# Patient Record
Sex: FEMALE | URBAN - NONMETROPOLITAN AREA
[De-identification: names, ages, dates, MRNs, and addresses within clinical notes are randomized per-mention and may not be internally consistent; named-entity substitution may affect disease eponyms.]

---

## 2020-08-10 ENCOUNTER — NURSE TRIAGE (OUTPATIENT)
Dept: CALL CENTER | Facility: HOSPITAL | Age: 30
End: 2020-08-10

## 2021-06-21 ENCOUNTER — OFFICE VISIT (OUTPATIENT)
Dept: CARDIOLOGY CLINIC | Age: 31
End: 2021-06-21
Payer: MEDICAID

## 2021-06-21 VITALS
HEIGHT: 64 IN | WEIGHT: 130 LBS | HEART RATE: 103 BPM | OXYGEN SATURATION: 97 % | BODY MASS INDEX: 22.2 KG/M2 | DIASTOLIC BLOOD PRESSURE: 62 MMHG | SYSTOLIC BLOOD PRESSURE: 108 MMHG

## 2021-06-21 DIAGNOSIS — R00.2 PALPITATIONS: ICD-10-CM

## 2021-06-21 DIAGNOSIS — R00.2 PALPITATIONS: Primary | ICD-10-CM

## 2021-06-21 DIAGNOSIS — R00.0 TACHYCARDIA: ICD-10-CM

## 2021-06-21 LAB
BASOPHILS ABSOLUTE: 0 K/UL (ref 0–0.2)
BASOPHILS RELATIVE PERCENT: 0.4 % (ref 0–1)
EOSINOPHILS ABSOLUTE: 0.1 K/UL (ref 0–0.6)
EOSINOPHILS RELATIVE PERCENT: 0.9 % (ref 0–5)
HCT VFR BLD CALC: 42.5 % (ref 37–47)
HEMOGLOBIN: 14.5 G/DL (ref 12–16)
IMMATURE GRANULOCYTES #: 0 K/UL
LYMPHOCYTES ABSOLUTE: 2.1 K/UL (ref 1.1–4.5)
LYMPHOCYTES RELATIVE PERCENT: 27.7 % (ref 20–40)
MCH RBC QN AUTO: 31.9 PG (ref 27–31)
MCHC RBC AUTO-ENTMCNC: 34.1 G/DL (ref 33–37)
MCV RBC AUTO: 93.6 FL (ref 81–99)
MONOCYTES ABSOLUTE: 0.5 K/UL (ref 0–0.9)
MONOCYTES RELATIVE PERCENT: 5.9 % (ref 0–10)
NEUTROPHILS ABSOLUTE: 4.9 K/UL (ref 1.5–7.5)
NEUTROPHILS RELATIVE PERCENT: 65 % (ref 50–65)
PDW BLD-RTO: 12.4 % (ref 11.5–14.5)
PLATELET # BLD: 220 K/UL (ref 130–400)
PMV BLD AUTO: 10.6 FL (ref 9.4–12.3)
RBC # BLD: 4.54 M/UL (ref 4.2–5.4)
T4 FREE: 1.15 NG/DL (ref 0.93–1.7)
TSH SERPL DL<=0.05 MIU/L-ACNC: 1.08 UIU/ML (ref 0.27–4.2)
WBC # BLD: 7.6 K/UL (ref 4.8–10.8)

## 2021-06-21 PROCEDURE — 93000 ELECTROCARDIOGRAM COMPLETE: CPT | Performed by: NURSE PRACTITIONER

## 2021-06-21 PROCEDURE — 93246 EXT ECG>7D<15D RECORDING: CPT | Performed by: NURSE PRACTITIONER

## 2021-06-21 PROCEDURE — 99203 OFFICE O/P NEW LOW 30 MIN: CPT | Performed by: NURSE PRACTITIONER

## 2021-06-21 RX ORDER — OMEPRAZOLE 10 MG/1
10 CAPSULE, DELAYED RELEASE ORAL DAILY
COMMUNITY

## 2021-06-21 ASSESSMENT — ENCOUNTER SYMPTOMS
CHEST TIGHTNESS: 0
COUGH: 0
SORE THROAT: 0
SHORTNESS OF BREATH: 0
WHEEZING: 0

## 2021-06-21 NOTE — PROGRESS NOTES
[facility]  [address]  [phone]      Chief Complaint / Reason for Being Seen: Tachycardia    1. Palpitations    2. Tachycardia      Patient presents to clinic today as a referral from her PCP for tachycardia. Patient has a family history of coronary artery disease on her father's side. She is a current smoker of 20 years half pack per day. Patient had a ER visit at [facility] a few weeks ago for palpitations, chest discomfort. Patient stated her heart rate got as fast as 135 bpm.  This caused her to have some chest discomfort and pain in her neck and left arm. Work-up at [facility] was unremarkable. Patient denies any further episodes of chest pain. Patient denies any shortness of breath, orthopnea or PND. Patient does still report episodes of tachycardia/palpitations. This occurs several times a week. She does associate lightheadedness and dizziness but no syncope. Subjective: Old records have been obtained from the referring providers. Those records have been reviewed and summarized.       Andrew Guillory is a 33 y.o. female with the following history as recorded in [facility]:  Patient Active Problem List    Diagnosis Date Noted    Seborrheic dermatitis of scalp 07/17/2015    Migraine 07/10/2015    Anxiety disorder 06/09/2015    Panic attacks 06/09/2015    Back pain 06/09/2015     Current Outpatient Medications   Medication Sig Dispense Refill    metoprolol tartrate (LOPRESSOR) 25 MG tablet Take 25 mg by mouth 2 times daily Patient takes 1/2 tablet in the Am then 1/2 in PM      omeprazole (PRILOSEC) 10 MG delayed release capsule Take 10 mg by mouth daily      meloxicam (MOBIC) 15 MG TABS Take 15 mg by mouth daily (Patient taking differently: Take 15 mg by mouth daily As needed) 30 tablet 3    VENTOLIN  (90 Base) MCG/ACT inhaler        No current facility-administered medications for rebound  MUSCULOSKELETAL  - range of motion of the upper and lower extermites appears normal and equal and is without pain   EXTREMITIES - no significant edema   NEUROLOGIC - gait and station are normal  SKIN - turgor is normal, skin warm and dry. PSYCHIATRIC - normal mood and affect, alert and orientated x 3,      ASSESSMENT:    ALL THE CARDIOLOGY PROBLEMS ARE LISTED ABOVE; HOWEVER, THE FOLLOWING SPECIFIC CARDIAC PROBLEMS / CONDITIONS WERE ADDRESSED AND TREATED DURING THE OFFICE VISIT TODAY:       Cardiac Specific Problem  Discussion and Plan         1. Palpitations/tachycardia  initial encounter   EKG in the office showing sinus rhythm with a heart rate of 87 bpm.  Will order TSH, T4 free, CBC. Will place 14-day ZIO Patch to address for Fausto tachycardia, SVT. We will also order 2D echo. PLAN:    Orders Placed This Encounter   Procedures    TSH without Reflex    T4, Free     Standing Status:   Future     Standing Expiration Date:   6/21/2022    CBC Auto Differential     Standing Status:   Future     Standing Expiration Date:   6/21/2022    EKG 12 lead     Order Specific Question:   Reason for Exam?     Answer:   Irregular heart rate    Echo 2D w doppler w color complete     Standing Status:   Future     Standing Expiration Date:   6/21/2022     Order Specific Question:   Reason for exam:     Answer:   palpitations    SD EXTERNAL ECG REC>7D<15D RECORDING     No orders of the defined types were placed in this encounter. Return in about 4 weeks (around 7/19/2021) for results with me . Discussed with patient. I greatly appreciate the opportunity to meet Chao [employer] and your confidence in allowing me to participate in her cardiovascular care. CHRIS Saldana NP 6/21/2021 2:40 PM CDT                    This dictation was generated by voice recognition computer software.  Although all attempts are made to edit dictation for accuracy, there may be errors in the transcription that are not intended.

## 2021-06-23 ENCOUNTER — TELEPHONE (OUTPATIENT)
Dept: CARDIOLOGY CLINIC | Age: 31
End: 2021-06-23

## 2021-06-23 NOTE — TELEPHONE ENCOUNTER
Patient called back stating that her pharmacy told her to call office for an Rx for benadryl or allegra for reaction to ZIO adhesive. They also told her that you could prescribe a tape to go over ZIO. She said she is just using clear tape. She said someone gave her a few benadryl and she has been taking (2) 25 mg tablets. She can't afford to get it OTC.

## 2021-06-23 NOTE — TELEPHONE ENCOUNTER
Patient notified that benadryl will be called to pharmacy but not any adhesive. Advised when monitor falls off to mail back in. She said that when her APRN calls back she will call her in some tape.

## 2021-07-19 ENCOUNTER — TELEPHONE (OUTPATIENT)
Dept: CARDIOLOGY CLINIC | Age: 31
End: 2021-07-19

## 2021-07-26 ENCOUNTER — TELEPHONE (OUTPATIENT)
Dept: CARDIOLOGY CLINIC | Age: 31
End: 2021-07-26

## 2021-07-26 NOTE — TELEPHONE ENCOUNTER
Called patient. Zio result still not received. Left patient message to move appointment to Thursday afternoon.

## 2021-11-24 ENCOUNTER — TELEPHONE (OUTPATIENT)
Dept: PAIN MANAGEMENT | Age: 31
End: 2021-11-24

## 2021-11-24 NOTE — TELEPHONE ENCOUNTER
MRS WORLEY HAD UPCOMING OFFICE APPT  AT OUR OFFICE. OFFICE CANCEL DUE TO CANCELLATIONS,TO MANY NO SHOW OF APPTS,AND RECENT HISTORY OF ELICIT DRUG USE. NOTIFY PROVIDER PER PROVIDER DO NOT RESCHEDULE FOR APPT.

## 2021-12-20 ENCOUNTER — TELEPHONE (OUTPATIENT)
Dept: CARDIOLOGY CLINIC | Age: 31
End: 2021-12-20

## 2021-12-20 NOTE — TELEPHONE ENCOUNTER
Felicia requests a call back please, she wishes to know if she still needs to have the Echo test done. Thank you.

## 2021-12-22 ENCOUNTER — TELEPHONE (OUTPATIENT)
Dept: CARDIOLOGY CLINIC | Age: 31
End: 2021-12-22

## 2021-12-22 NOTE — TELEPHONE ENCOUNTER
Felicia called to let [facility] know that she has been schedule for her Echo test for tomorrow. Patient also advised that she needs to update her family history as her mother has heart issues also. Patient requested a phone call to discuss the family history and also to discuss the Echo results when same are to hand as she has a hard time getting transport to [address] [place] [address]. Thank you.

## 2022-12-16 ENCOUNTER — OFFICE VISIT (OUTPATIENT)
Dept: CARDIOLOGY CLINIC | Age: 32
End: 2022-12-16
Payer: MEDICAID

## 2022-12-16 VITALS
WEIGHT: 140.6 LBS | DIASTOLIC BLOOD PRESSURE: 64 MMHG | OXYGEN SATURATION: 100 % | SYSTOLIC BLOOD PRESSURE: 118 MMHG | BODY MASS INDEX: 24.01 KG/M2 | HEART RATE: 90 BPM | HEIGHT: 64 IN

## 2022-12-16 DIAGNOSIS — R00.0 TACHYCARDIA: ICD-10-CM

## 2022-12-16 DIAGNOSIS — R00.2 PALPITATIONS: Primary | ICD-10-CM

## 2022-12-16 PROCEDURE — 99213 OFFICE O/P EST LOW 20 MIN: CPT | Performed by: NURSE PRACTITIONER

## 2022-12-16 RX ORDER — TIZANIDINE 4 MG/1
4 TABLET ORAL 2 TIMES DAILY PRN
COMMUNITY

## 2022-12-16 RX ORDER — MELOXICAM 7.5 MG/1
7.5 TABLET ORAL DAILY
COMMUNITY

## 2022-12-16 ASSESSMENT — ENCOUNTER SYMPTOMS
COUGH: 0
WHEEZING: 0
CHEST TIGHTNESS: 0
SORE THROAT: 0
SHORTNESS OF BREATH: 0

## 2022-12-16 NOTE — PROGRESS NOTES
[id number] [facility]   Established Patient Office Visit   [facility]. [address]  [phone]        OFFICE VISIT:  2022    Anne Boone - : 1990    Reason For Visit:  Ladarius Gregory is a 27 y.o. female who is here for 6 Month Follow-Up    1. Palpitations    2. Tachycardia        Patient presented to our clinic on 2021 as a referral from her primary care provider for tachycardia. She does have a family history of coronary artery disease on her father side. Current smoker of 20 years half pack per day. She had had last year an ER visit to [facility] for palpitations and chest discomfort. She stated that her heart rate got as fast as 135 bpm.  This caused her to have some chest discomfort. Work-up at [facility] was unremarkable. She was seen in our office in  and placed on beta-blocker. Work-up for us included TSH in normal limits, T4 free than normal limits, CBC within normal limits and a 14-day ZIO patch. ZIO patch showed:    Sinus rhythm with a heart rate of 74 bpm average. Minimum 42 beats minute. Maximum 174 bpm.  There was no VT, pauses, high degree AV block's, A. fib nor SVT noted. Patient presents to clinic today for routine follow-up. States a couple weeks ago she had to go back to [facility] because her heart rate got so fast it got up to 120 bpm and she was concerned. They felt it was anxiety. She is still dealing with the anxiety that while she was incarcerated in April her mother had defibrillator placed.            Subjective    Anne Boone is a 27 y.o. female [facility] the [facility]:    Patient Active Problem List    Diagnosis Date Noted    Seborrheic dermatitis of scalp 2015    Migraine 07/10/2015    Anxiety disorder 2015    Panic attacks 2015    Back pain 2015     Current Outpatient Medications   Medication Sig Dispense Refill tiZANidine (ZANAFLEX) 4 MG tablet Take 4 mg by mouth 2 times daily as needed      meloxicam (MOBIC) 7.5 MG tablet Take 7.5 mg by mouth daily      metoprolol tartrate (LOPRESSOR) 25 MG tablet Take 25 mg by mouth 2 times daily Patient takes 1/2 tablet in the Am then 1/2 in PM      VENTOLIN  (90 Base) MCG/ACT inhaler       omeprazole (PRILOSEC) 10 MG delayed release capsule Take 10 mg by mouth daily       No current facility-administered medications for this visit. Allergies: Latex, Levaquin [levofloxacin], Morphine, and Valium [diazepam]  Past Medical History:   Diagnosis Date    Anxiety     DDD (degenerative disc disease)     Headache(784.0)     Sciatica      Past Surgical History:   Procedure Laterality Date     SECTION      CHOLECYSTECTOMY      DILATION AND CURETTAGE OF UTERUS      and cone    HYSTERECTOMY (CERVIX STATUS UNKNOWN)      SKIN GRAFT      right ear    TUBAL LIGATION      TYMPANOSTOMY TUBE PLACEMENT       Family History   Problem Relation Age of Onset    Heart Attack Father      Social History     Tobacco Use    Smoking status: Every Day     Packs/day: 0.50     Types: Cigarettes    Smokeless tobacco: Never   Substance Use Topics    Alcohol use: Not Currently     Comment: occ          Review of Systems:    Review of Systems   Constitutional:  Negative for activity change, chills, diaphoresis, fatigue and fever. HENT:  Negative for congestion and sore throat. Respiratory:  Negative for cough, chest tightness, shortness of breath and wheezing. Cardiovascular:  Positive for palpitations. Negative for chest pain and leg swelling. Neurological:  Negative for dizziness, syncope, light-headedness and headaches. Psychiatric/Behavioral:  Negative for confusion. The patient is not nervous/anxious.        Objective:    /64   Pulse 90   Ht 5' 4\" (1.626 m)   Wt 140 lb 9.6 oz (63.8 kg)   SpO2 100%   BMI 24.13 kg/m²     GENERAL - well developed and well nourished, conversant  HEENT -  PERRLA, Hearing appears normal, gentleman lids are normal.  External inspection of ears and nose appear normal.  NECK - no thyromegaly, no JVD, trachea is in the midline  CARDIOVASCULAR - PMI is in the mid line clavicular position, Normal S1 and S2 with no systolic murmur. No S3 or S4    PULMONARY - no respiratory distress. No wheezes or rales. Lungs are clear to ausculation, normal respiratory effort. ABDOMEN  - soft, non tender, no rebound  MUSCULOSKELETAL  - range of motion of the upper and lower extermites appears normal and equal and is without pain   EXTREMITIES - no significant edema   NEUROLOGIC - gait and station are normal  SKIN - turgor is normal, can warm and dry. PSYCHIATRIC - normal mood and affect, alert and orientated x 3,      ASSESSMENT:    ALL THE CARDIOLOGY PROBLEMS ARE LISTED ABOVE; HOWEVER, THE FOLLOWING SPECIFIC CARDIAC PROBLEMS / CONDITIONS WERE ADDRESSED AND TREATED DURING THE OFFICE VISIT TODAY:                                                                                            MEDICAL DECISION MAKING             Cardiac Specific Problem / Diagnosis  Discussion and Data Reviewed Diagnostic Procedures Ordered Management Options Selected           1. Tachycardia/Palpitations  Stable   Review and summation of old records:    Patient states she has been taking her Lopressor 12.5 mg twice a day as needed. She states she has been needing to take this 2-3 times a month. No Continue current medications: Yes                                                     No orders of the defined types were placed in this encounter. No orders of the defined types were placed in this encounter. Discussed with patient. Return in about 2 months (around 2/16/2023) for Dr Arjun Montalvo (\[facility]") . I greatly appreciate the opportunity to meet Chao [employer] and your confidence in allowing me to participate in her cardiovascular care.     CHRIS Perez NP  12/16/2022 9:43 LESLEY GOVEA                    This dictation was generated by voice recognition computer software. Although all attempts are made to edit dictation for accuracy, there may be errors in the transcription that are not intended.

## 2023-02-20 ENCOUNTER — TELEPHONE (OUTPATIENT)
Dept: CARDIOLOGY CLINIC | Age: 33
End: 2023-02-20

## 2023-02-21 ENCOUNTER — TELEPHONE (OUTPATIENT)
Dept: CARDIOLOGY CLINIC | Age: 33
End: 2023-02-21